# Patient Record
Sex: MALE | Race: BLACK OR AFRICAN AMERICAN | URBAN - METROPOLITAN AREA
[De-identification: names, ages, dates, MRNs, and addresses within clinical notes are randomized per-mention and may not be internally consistent; named-entity substitution may affect disease eponyms.]

---

## 2018-10-30 NOTE — PATIENT DISCUSSION
MODERATE DRY EYES: PRESCRIBED DISAPPEARING OTC PRESERVATIVE OR OTC PRESERVATIVE FREE ARTIFICIAL TEARS BID ? QID4-6X A DAY, OU AND THE DAILY INTAKE OF OMEGA-3 DHA/EPA FATTY ACIDS TO HELP RELIEVE SYMPTOMS. ADD NIGHTLY LUBRICATING OINTMENT OR GEL. RX XIIDRA BID OU. AND WILL CONSIDER PUNCTAL PLUGS AND/OR LIPIFLOW TREATMENT NEXT VISIT IF NOT RESPONSIVE OR IF SYMPTOMS PERSIST. RETURN FOR FOLLOW-UP AS SCHEDULED OR SOONER IF SYMPTOMS WORSEN.

## 2018-11-13 NOTE — PATIENT DISCUSSION
MODERATE DRY EYES: PRESCRIBED DISAPPEARING OTC PRESERVATIVE OR OTC PRESERVATIVE FREE ARTIFICIAL TEARS BID ? QID4-6X A DAY, OU AND THE DAILY INTAKE OF OMEGA-3 DHA/EPA FATTY ACIDS TO HELP RELIEVE SYMPTOMS. ADD NIGHTLY LUBRICATING OINTMENT OR GEL. Malcolm Nickel. AND WILL CONSIDER PUNCTAL PLUGS AND/OR LIPIFLOW TREATMENT NEXT VISIT IF NOT RESPONSIVE OR IF SYMPTOMS PERSIST. RETURN FOR FOLLOW-UP AS SCHEDULED OR SOONER IF SYMPTOMS WORSEN.

## 2019-02-13 NOTE — PATIENT DISCUSSION
MODERATE DRY EYES: PRESCRIBED DISAPPEARING OTC PRESERVATIVE OR OTC PRESERVATIVE FREE ARTIFICIAL TEARS BID ? QID4-6X A DAY, OU AND THE DAILY INTAKE OF OMEGA-3 DHA/EPA FATTY ACIDS TO HELP RELIEVE SYMPTOMS. ADD NIGHTLY LUBRICATING OINTMENT OR GEL. Lynnette Howell. AND WILL CONSIDER PUNCTAL PLUGS AND/OR LIPIFLOW TREATMENT NEXT VISIT IF NOT RESPONSIVE OR IF SYMPTOMS PERSIST. RETURN FOR FOLLOW-UP AS SCHEDULED OR SOONER IF SYMPTOMS WORSEN.

## 2019-03-11 NOTE — PATIENT DISCUSSION
Surgery Counseling: I have discussed the option of scheduling surgery versus following, as well as the risks, benefits and alternatives of cataract surgery with the patient. It was explained that the surgery is medically indicated at this time, and it can be performed at the patient's option as delaying will cause no further deterioration, therefore there is no rush and there is no harm in waiting to have surgery. It was also explained that there is no guarantee that removing the cataract will improve their vision. The patient understands and desires to proceed with cataract surgery with the implantation of an intraocular lens to improve vision for __READING__. I have given the patient the prescribed regimen of the all-in-one drop to use before and after cataract surgery. They have elected to use the all-in-one option of Pred/Gati/Brom(prednisolone acetate,gatifloxacin,and bromfenac. Patient to administer as directed.

## 2019-03-11 NOTE — PATIENT DISCUSSION
CATARACTS, OU - VISUALLY SIGNIFICANT. SCHEDULE _OS_ FIRST THEN LATER IN _OD_ DISCUSSED OPTION OF ____STANDARD ______VS ___ STANDARD WITH LENSX_VS MULTIFOCAL___. PATIENT UNDERSTANDS AND DESIRES ______MULTIFOCAL_________.

## 2019-05-14 NOTE — PATIENT DISCUSSION
Surgery Counseling: I have discussed the option of scheduling surgery versus following, as well as the risks, benefits and alternatives of cataract surgery with the patient. It was explained that the surgery is medically indicated at this time, and it can be performed at the patient's option as delaying will cause no further deterioration, therefore there is no rush and there is no harm in waiting to have surgery. It was also explained that there is no guarantee that removing the cataract will improve their vision. The patient understands and desires to proceed with cataract surgery with the implantation of an intraocular lens to improve vision for ____READING____________. I have given the patient the prescribed regimen of the all-in-one drop to use before and after cataract surgery. They have elected to use the all-in-one option of Pred/Gati/Brom(prednisolone acetate,gatifloxacin,and bromfenac. Patient to administer as directed.

## 2019-06-14 NOTE — PATIENT DISCUSSION
Post-Op Instructions: The patient was instructed in the proper use of post-operative eye drops: Imprimis TID as directed. Follow up as scheduled for 2 weeks.

## 2019-06-24 NOTE — PATIENT DISCUSSION
Pre-Op 2nd Eye Counseling: The patient has noticed an improvement in their visual symptoms in the operative eye. The patient complains of decreased vision in the fellow eye when _driving / reading__. It was explained to the patient that the decision to proceed with cataract surgery in the fellow eye is entirely a separate decision from the surgical eye. All of the same risks, benefits and alternatives are reviewed with the patient again. The patient does feel the vision in the non-operative eye is limiting their daily activities and elects to proceed with cataract surgery in the __right__ eye. . I have given the patient the prescribed regimen of  drops to use before and after cataract surgery. Patient to administer as directed.

## 2019-06-24 NOTE — PATIENT DISCUSSION
S/P PE IOL, __os_. DOING WELL. CONTINUE PRED-GATI-BROM IN THE SURGICAL EYE  FOR A TOTAL OF 3 WEEKS USE THEN DISCONTINUE. PATIENT DESIRES __symphony multifocal _IOL FOR 2ND EYE. SCHEDULE CATARACT SURGERY.

## 2019-12-09 NOTE — PATIENT DISCUSSION
Stopped Today: Xiidra (lifitegrast): dropperette: 5% twice a day [facility] Follow[facility] Visit Note    Referring Physician : Sascha Francisco MD  [address] [place] [address] [place] RECORD NUMBER:  [id number]  AGE: 69 y.o. GENDER: male  : 1952  EPISODE DATE:  2019    Subjective:     Chief Complaint   Patient presents with    Wound Check     patient arrived to wound care for treatmento fo bilateral lower leg ulcers      HISTORY of PRESENT ILLNESS VAMSHI Méndez is a 69 y.o. male who presents today in regards to follow up evaluation and treatment of wound/ulcer. That patient's past medical, family and social hx were reviewed and changes were made if present. History of Wound Context:  Doing well, no nausea, vomiting, fever, chills, shortness of breath or chest pain. Tolerating dressing changes as well as compression.     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     Past Surgical History:   Procedure Laterality Date    FRACTURE SURGERY      right ankle repair     Family History   Problem Relation Age of Onset    Heart Disease Mother     Heart Disease Father      Social History     Tobacco Use    Smoking status: Never Smoker    Smokeless tobacco: Never Used   Substance Use Topics    Alcohol use: No     Comment: on special occassions    Drug use: No     Allergies   Allergen Reactions    Metformin And Related Diarrhea     Current Outpatient Medications on File Prior to Encounter   Medication Sig Dispense Refill    aspirin (ASPIRIN CHILDRENS) 81 MG chewable tablet Take 1 tablet by mouth daily 30 tablet 5     No current facility-administered medications on file prior to encounter. REVIEW OF SYSTEMS See HPI    Objective:    /76   Pulse 84   Temp 97.9 °F (36.6 °C) (Oral)   Resp 20   Ht 6' 2\" (1.88 m)   Wt (!) 342 lb (155.1 kg)   BMI 43.91 kg/m²   Wt Readings from Last 3 Encounters:   08/22/19 (!) 342 lb (155.1 kg)   08/15/19 (!) 342 lb (155.1 kg)   07/25/19 (!) 342 lb (155.1 kg)     PHYSICAL EXAM  CONSTITUTIONAL:   Awake, alert, cooperative   EYES:  lids and lashes normal   ENT: external ears and nose without lesions   NECK:  supple, symmetrical, trachea midline   SKIN:  Open wounds, bilateral lower extremities, both provement. We are seeing decreased in maceration on the left lower leg. There is no purulence or odor. No surrounding erythema or fluctuance. No pain. Positive edema. Stasis changes. Assessment:     Venous ulcer.  Lymphedema        Wound 05/23/19 Leg Right; Lower; Lateral #1 aquired 1-3-19 (Active)   Wound Image   8/15/2019  1:01 PM   Wound Venous 5/23/2019  1:26 PM   Dressing Status Clean;Dry; Intact 8/15/2019  1:51 PM   Dressing Changed Changed/New 8/15/2019  1:51 PM   Dressing/Treatment Alginate;Dry Dressing 8/15/2019  1:51 PM   Wound Cleansed Rinsed/Irrigated with saline 8/15/2019  1:51 PM   Wound Length (cm) 3 cm 8/22/2019  1:07 PM   Wound Width (cm) 1 cm 8/22/2019  1:07 PM   Wound Depth (cm) 0.2 cm 8/22/2019  1:07 PM   Wound Surface Area (cm^2) 3 cm^2 8/22/2019  1:07 PM   Change in Wound Size % (l*w) 95.24 8/22/2019  1:07 PM   Wound Volume (cm^3) 0.6 cm^3 8/22/2019  1:07 PM   Wound Healing % 98 8/22/2019  1:07 PM   Post-Procedure Length (cm) 3 cm 8/22/2019  1:23 PM   Post-Procedure Width (cm) 1 cm 8/22/2019  1:23 PM   Post-Procedure Depth (cm) 0.2 cm 8/22/2019  1:23 PM   Post-Procedure Surface Area (cm^2) 3 cm^2 8/22/2019  1:23 PM   Post-Procedure Volume (cm^3) 0.6 cm^3 8/22/2019  1:23 PM   Wound Assessment

## 2020-12-31 ENCOUNTER — IMPORTED ENCOUNTER (OUTPATIENT)
Dept: URBAN - METROPOLITAN AREA CLINIC 50 | Facility: CLINIC | Age: 45
End: 2020-12-31

## 2021-04-23 ASSESSMENT — VISUAL ACUITY
OS_PH: @ 18 IN
OD_CC: J1+@ 18 IN
OS_CC: J1+@ 18 IN
OD_PH: @ 18 IN
OS_SC: 20/200
OD_SC: 20/200
OS_PH: 20/30
OD_PH: 20/30

## 2021-04-23 ASSESSMENT — TONOMETRY
OD_IOP_MMHG: 16
OS_IOP_MMHG: 16

## 2021-04-28 NOTE — PATIENT DISCUSSION
AMD (DRY), OU:  CONTINUE AREDS 2 VITAMINS / AMSLER GRID QD/ UV PROTECTION. SMOKING CESSATION EMPHASIZED. RETURN FOR 6 MONTH FOLLOW-UP, AS SCHEDULED FOR FUTHER OCT TESTING EVALUATION AND MANAGEMENT.

## 2021-08-31 NOTE — PATIENT DISCUSSION
DRY EYE WITH INFLAMMATION:  PRESERVATIVE FREE ARTIFICIAL TEARS Q2 HOURS, LID HYGIENE. PRESCRIBED EYSUVIS TID. CONTINUE RESTASIS  AND WILL CONSIDER PUNCTAL PLUGS AND/OR LIPIFLOW AT FOLLOW. RETURN SOONER IF SYMPTOMS WORSEN.

## 2021-08-31 NOTE — PATIENT DISCUSSION
New Prescription: Eysuvis (loteprednol etabonate): drops,suspension: 0.25% 1 drop three times a day into both eyes 08-

## 2021-09-16 NOTE — PATIENT DISCUSSION
DRY EYE WITH INFLAMMATION:  PRESERVATIVE FREE ARTIFICIAL TEARS Q2 HOURS, LID HYGIENE. CONTINUE EYSUVIS BID FOR ONE WEEK THEN PRN OU THEREAFTER. CONSIDER RX STRENGTH GTT (RESTASIS, Husam Diaz), PUNCTAL PLUGS AND/OR LIPIFLOW AT FOLLOW. RETURN SOONER IF SYMPTOMS WORSEN.

## 2021-09-16 NOTE — PATIENT DISCUSSION
Continue: Eysuvis (loteprednol etabonate): drops,suspension: 0.25% 1 drop three times a day into both eyes 08-

## 2021-10-04 NOTE — PATIENT DISCUSSION
DRY EYE WITH INFLAMMATION: PRESERVATIVE FREE ARTIFICIAL TEARS Q2 HOURS, LID HYGIENE. CONTINUE EYSUVIS BID FOR ONE WEEK THEN PRN OU THEREAFTER. CONSIDER RX STRENGTH GTT (RESTASIS, Amisha Nicodemus), PUNCTAL PLUGS AND/OR LIPIFLOW AT FOLLOW. RETURN SOONER IF SYMPTOMS WORSEN.

## 2021-10-04 NOTE — PATIENT DISCUSSION
Continue: Eysuvis (loteprednol etabonate): drops,suspension: 0.25% 1 drop three times a day into both eyes 08-.

## 2021-10-14 NOTE — PATIENT DISCUSSION
Continue: [facility] (lifitegrast): dropperette: 5% 1 drop twice a day into both eyes 08-.

## 2021-10-14 NOTE — PATIENT DISCUSSION
DRY EYE WITH INFLAMMATION: PRESERVATIVE FREE ARTIFICIAL TEARS Q2 HOURS, LID HYGIENE. CONTINUE EYSUVIS BID FOR ONE WEEK THEN PRN OU THEREAFTER. CONSIDER RX STRENGTH GTT (Aminata PACHECO), PUNCTAL PLUGS AND/OR LIPIFLOW AT FOLLOW. RETURN SOONER IF SYMPTOMS WORSEN.

## 2021-12-28 ENCOUNTER — PREPPED CHART (OUTPATIENT)
Dept: URBAN - METROPOLITAN AREA CLINIC 50 | Facility: CLINIC | Age: 46
End: 2021-12-28

## 2022-01-20 NOTE — PATIENT DISCUSSION
DRY EYE WITH INFLAMMATION: PRESERVATIVE FREE ARTIFICIAL TEARS Q2 HOURS, LID HYGIENE. CONTINUE EYSUVIS BID FOR ONE WEEK THEN PRN OU THEREAFTER. CONSIDER RX STRENGTH GTT (RESTASIS, Holger Mchugh), PUNCTAL PLUGS AND/OR LIPIFLOW AT FOLLOW. RETURN SOONER IF SYMPTOMS WORSEN.

## 2022-02-04 NOTE — PATIENT DISCUSSION
DRY EYE WITH INFLAMMATION: PRESERVATIVE FREE ARTIFICIAL TEARS Q2 HOURS, LID HYGIENE. CONTINUE EYSUVIS BID FOR ONE WEEK THEN PRN OU THEREAFTER. CONSIDER RX STRENGTH GTT (RESTASIS, Luis Miguel Failing), PUNCTAL PLUGS AND/OR LIPIFLOW AT FOLLOW. RETURN SOONER IF SYMPTOMS WORSEN.

## 2022-09-12 NOTE — PATIENT DISCUSSION
DRY EYE WITH INFLAMMATION: PRESERVATIVE FREE ARTIFICIAL TEARS Q2 HOURS, LID HYGIENE. CONTINUE EYSUVIS BID FOR ONE WEEK THEN PRN OU THEREAFTER. CONSIDER RX STRENGTH GTT (RESTASIS, Diana Shira), PUNCTAL PLUGS AND/OR LIPIFLOW AT FOLLOW. RETURN SOONER IF SYMPTOMS WORSEN.

## 2022-11-10 NOTE — PATIENT DISCUSSION
DRY EYE WITH INFLAMMATION: PRESERVATIVE FREE ARTIFICIAL TEARS Q2 HOURS, LID HYGIENE. CONTINUE EYSUVIS BID FOR ONE WEEK THEN PRN OU THEREAFTER. CONSIDER RX STRENGTH GTT (RESTASIS, Oddis Austin), PUNCTAL PLUGS AND/OR LIPIFLOW AT FOLLOW. RETURN SOONER IF SYMPTOMS WORSEN.